# Patient Record
Sex: FEMALE | Race: WHITE | NOT HISPANIC OR LATINO | URBAN - NONMETROPOLITAN AREA
[De-identification: names, ages, dates, MRNs, and addresses within clinical notes are randomized per-mention and may not be internally consistent; named-entity substitution may affect disease eponyms.]

---

## 2018-07-31 PROBLEM — H35.3131: Noted: 2018-07-31

## 2018-07-31 PROBLEM — Z96.1: Noted: 2019-07-22

## 2018-07-31 PROBLEM — H40.013: Noted: 2019-07-22

## 2018-07-31 PROBLEM — H35.373: Noted: 2018-07-31

## 2018-07-31 PROBLEM — H04.123: Noted: 2018-07-31

## 2018-07-31 PROBLEM — H04.123: Noted: 2019-07-22

## 2018-07-31 PROBLEM — H02.055: Noted: 2018-07-31

## 2018-07-31 PROBLEM — Z96.1: Noted: 2018-07-31

## 2018-07-31 PROBLEM — H40.003: Noted: 2018-07-31

## 2019-01-26 ENCOUNTER — IMPORTED ENCOUNTER (OUTPATIENT)
Dept: URBAN - NONMETROPOLITAN AREA CLINIC 1 | Facility: CLINIC | Age: 84
End: 2019-01-26

## 2019-01-26 PROCEDURE — 99214 OFFICE O/P EST MOD 30 MIN: CPT

## 2019-01-26 NOTE — PATIENT DISCUSSION
AMD - dry-Explained dry AMD and advised that there are no treatments available at this time.-Continue AREDS 2 MVT. -Continue Amsler grid monitoring daily. Pt is to contact our office if any changes are noted. -RTC 6 months CEE/MRx*Epiretinal membrane: OU-Stable.-Discussed findings of exam in detail with the patient.-Discussed the signs of worsening of the disease. .Glaucoma Suspect-Based on CD assymetry-Appears stable at this time.-Continue to monitor with exams and testing.; 's Notes: Dmitriy Conrad - 1/23/18MAC OCT - 7/31/18VF- 4/30/13DFE- 7/31/18

## 2019-07-22 ENCOUNTER — IMPORTED ENCOUNTER (OUTPATIENT)
Dept: URBAN - NONMETROPOLITAN AREA CLINIC 1 | Facility: CLINIC | Age: 84
End: 2019-07-22

## 2019-07-22 PROCEDURE — 92012 INTRM OPH EXAM EST PATIENT: CPT

## 2019-07-22 NOTE — PATIENT DISCUSSION
AMD - dry-Explained dry AMD and advised that there are no treatments available at this time.-Continue AREDS 2 MVT. -Continue Amsler grid monitoring daily. Pt is to contact our office if any changes are noted. *Epiretinal membrane: OU-Stable.-Discussed findings of exam in detail with the patient.-Discussed the signs of worsening of the disease. .Glaucoma Suspect-Based on CD assymetry-Appears stable at this time.-Continue to monitor with exams and testing.-Obtain vf before next appt. OSITO-Explained OSITO and associated symptoms.-Recommend increasing Omega 3s.-Pt to begin artificial tears OU QID PRN.  Pt will contact us if this does not provide relief.; 's Notes: Alice Ohm - 1/23/18MAC OCT - 7/31/18VF- 4/30/13DFE- 7/31/18

## 2020-01-13 ENCOUNTER — IMPORTED ENCOUNTER (OUTPATIENT)
Dept: URBAN - NONMETROPOLITAN AREA CLINIC 1 | Facility: CLINIC | Age: 85
End: 2020-01-13

## 2020-01-13 PROCEDURE — 92083 EXTENDED VISUAL FIELD XM: CPT

## 2020-01-13 NOTE — PATIENT DISCUSSION
AMD - dry-Explained dry AMD and advised that there are no treatments available at this time.-Continue AREDS 2 MVT. -Continue Amsler grid monitoring daily. Pt is to contact our office if any changes are noted. *Epiretinal membrane: OU-Stable.-Discussed findings of exam in detail with the patient.-Discussed the signs of worsening of the disease. .Glaucoma Suspect-Based on CD assymetry-Appears stable at this time.-Continue to monitor with exams and testing.-Obtain vf before next appt. OSITO-Explained OSITO and associated symptoms.-Recommend increasing Omega 3s.-Pt to begin artificial tears OU QID PRN.  Pt will contact us if this does not provide relief.; Dr's Notes: Shu Aquino - 1/23/18MAC OCT - 7/31/18VF- 4/30/13DFE- 7/31/18

## 2020-01-28 ENCOUNTER — IMPORTED ENCOUNTER (OUTPATIENT)
Dept: URBAN - NONMETROPOLITAN AREA CLINIC 1 | Facility: CLINIC | Age: 85
End: 2020-01-28

## 2020-01-28 PROCEDURE — 92014 COMPRE OPH EXAM EST PT 1/>: CPT

## 2020-01-28 NOTE — PATIENT DISCUSSION
AMD - dry-Explained dry AMD and advised that there are no treatments available at this time.-Continue AREDS 2 MVT. -Continue Amsler grid monitoring daily. Pt is to contact our office if any changes are noted. *Epiretinal membrane: OU-Stable.-Discussed findings of exam in detail with the patient.-Discussed the signs of worsening of the disease. .Glaucoma Suspect-Based on CD assymetry-Appears stable at this time.-Continue to monitor with exams and testing.-Obtain vf before next appt. OSITO-Explained OSITO and associated symptoms.-Recommend increasing Omega 3s.-Pt to begin artificial tears OU QID PRN.  Pt will contact us if this does not provide relief.; 's Notes: Roxanne Dunbar - 1/23/18MAC OCT - 7/31/18VF- 4/30/13DFE- 7/31/18

## 2020-07-29 ENCOUNTER — IMPORTED ENCOUNTER (OUTPATIENT)
Dept: URBAN - NONMETROPOLITAN AREA CLINIC 1 | Facility: CLINIC | Age: 85
End: 2020-07-29

## 2020-07-29 PROCEDURE — 92014 COMPRE OPH EXAM EST PT 1/>: CPT

## 2020-07-29 PROCEDURE — 92134 CPTRZ OPH DX IMG PST SGM RTA: CPT

## 2020-07-29 NOTE — PATIENT DISCUSSION
AMD - dry-Explained dry AMD and advised that there are no treatments available at this time.-Continue AREDS 2 MVT. -Continue Amsler grid monitoring daily. Pt is to contact our office if any changes are noted. oct today/ stable ou*Epiretinal membrane: OU-Stable.-Discussed findings of exam in detail with the patient.-Discussed the signs of worsening of the disease. Ayden Julio oct today/stable ouGlaucoma Suspect-Based on CD assymetry-Appears stable at this time.-Continue to monitor with exams and testing. OSITO-Explained OSITO and associated symptoms.-Recommend increasing Omega 3s.-Pt to begin artificial tears OU QID PRN.  Pt will contact us if this does not provide relief.; 's Notes: Lu Sarabia - 1/23/18MAC OCT - 7/31/18VF- 4/30/13DFE- 7/31/18

## 2021-04-08 ENCOUNTER — IMPORTED ENCOUNTER (OUTPATIENT)
Dept: URBAN - NONMETROPOLITAN AREA CLINIC 1 | Facility: CLINIC | Age: 86
End: 2021-04-08

## 2021-04-08 PROCEDURE — 92014 COMPRE OPH EXAM EST PT 1/>: CPT

## 2021-04-08 NOTE — PATIENT DISCUSSION
AMD - dry-Explained dry AMD and advised that there are no treatments available at this time.-Continue AREDS 2 MVT. -Continue Amsler grid monitoring daily. Pt is to contact our office if any changes are noted. *Epiretinal membrane: OU-Stable.-Discussed findings of exam in detail with the patient.-Discussed the signs of worsening of the disease. .Glaucoma Suspect-Based on CD assymetry-Appears stable at this time.-Continue to monitor with exams and testing. OSITO-Explained OSITO and associated symptoms.-Recommend increasing Omega 3s.-Pt to begin artificial tears OU QID PRN.  Pt will contact us if this does not provide relief.; 's Notes: Hilda Herrera - 1/23/18MAC OCT - 7/31/18VF- 4/30/13DFE- 7/31/18

## 2021-10-07 ENCOUNTER — IMPORTED ENCOUNTER (OUTPATIENT)
Dept: URBAN - NONMETROPOLITAN AREA CLINIC 1 | Facility: CLINIC | Age: 86
End: 2021-10-07

## 2021-10-07 ENCOUNTER — PREPPED CHART (OUTPATIENT)
Dept: RURAL CLINIC 1 | Facility: CLINIC | Age: 86
End: 2021-10-07

## 2021-10-07 PROCEDURE — 92014 COMPRE OPH EXAM EST PT 1/>: CPT

## 2021-10-07 PROCEDURE — 92133 CPTRZD OPH DX IMG PST SGM ON: CPT

## 2021-10-07 NOTE — PATIENT DISCUSSION
AMD - dry-Explained dry AMD and advised that there are no treatments available at this time.-Continue AREDS 2 MVT. -Continue Amsler grid monitoring daily. Pt is to contact our office if any changes are noted. oct today/stable ou*Epiretinal membrane: OU-Stable.-Discussed findings of exam in detail with the patient.-Discussed the signs of worsening of the disease. .Glaucoma Suspect-Based on CD assymetry-Appears stable at this time.-Continue to monitor with exams and testing. OSITO-Explained OSITO and associated symptoms.-Recommend increasing Omega 3s.-Pt to begin artificial tears OU QID PRN.  Pt will contact us if this does not provide relief.; 's Notes: Jeanette Poe - 1/23/18MAC OCT - 7/31/18VF- 4/30/13DFE- 7/31/18

## 2022-03-20 PROBLEM — C50.919 BREAST CANCER IN FEMALE (HCC): Status: ACTIVE | Noted: 2021-03-05

## 2022-04-07 ENCOUNTER — ESTABLISHED PATIENT (OUTPATIENT)
Dept: RURAL CLINIC 1 | Facility: CLINIC | Age: 87
End: 2022-04-07

## 2022-04-07 DIAGNOSIS — H40.013: ICD-10-CM

## 2022-04-07 DIAGNOSIS — H35.373: ICD-10-CM

## 2022-04-07 DIAGNOSIS — H35.3131: ICD-10-CM

## 2022-04-07 DIAGNOSIS — Z96.1: ICD-10-CM

## 2022-04-07 PROCEDURE — 92014 COMPRE OPH EXAM EST PT 1/>: CPT

## 2022-04-07 PROCEDURE — 92133 CPTRZD OPH DX IMG PST SGM ON: CPT

## 2022-04-07 ASSESSMENT — VISUAL ACUITY
OS_SC: 20/200
OS_PH: 20/40
OD_PH: 20/25
OD_SC: 20/50

## 2022-04-07 ASSESSMENT — TONOMETRY
OS_IOP_MMHG: 14
OD_IOP_MMHG: 14

## 2022-04-09 ASSESSMENT — VISUAL ACUITY
OD_SC: 20/25-
OS_SC: 20/20-2
OD_SC: 20/20
OU_SC: 20/25
OD_CC: 20/30
OS_CC: J1+
OD_CC: J1
OD_SC: 20/30
OS_SC: 20/30
OS_CC: 20/30
OD_SC: 20/30
OD_CC: J1+
OS_CC: J1
OS_SC: 20/30
OU_SC: 20/25
OD_CC: J1
OS_CC: J1
OD_SC: 20/25
OU_SC: 20/25
OS_SC: 20/30+
OS_SC: 20/25+

## 2022-04-09 ASSESSMENT — TONOMETRY
OS_IOP_MMHG: 14
OS_IOP_MMHG: 15
OD_IOP_MMHG: 12
OS_IOP_MMHG: 14
OS_IOP_MMHG: 10
OD_IOP_MMHG: 12
OD_IOP_MMHG: 13
OS_IOP_MMHG: 13
OD_IOP_MMHG: 12
OD_IOP_MMHG: 15

## 2022-08-16 ENCOUNTER — ESTABLISHED PATIENT (OUTPATIENT)
Dept: RURAL CLINIC 1 | Facility: CLINIC | Age: 87
End: 2022-08-16

## 2022-08-16 DIAGNOSIS — Z96.1: ICD-10-CM

## 2022-08-16 DIAGNOSIS — H35.373: ICD-10-CM

## 2022-08-16 DIAGNOSIS — H04.123: ICD-10-CM

## 2022-08-16 DIAGNOSIS — H40.013: ICD-10-CM

## 2022-08-16 DIAGNOSIS — H35.3131: ICD-10-CM

## 2022-08-16 PROCEDURE — 92014 COMPRE OPH EXAM EST PT 1/>: CPT

## 2022-08-16 PROCEDURE — 92134 CPTRZ OPH DX IMG PST SGM RTA: CPT

## 2022-08-16 ASSESSMENT — TONOMETRY
OS_IOP_MMHG: 17
OD_IOP_MMHG: 17

## 2022-08-16 ASSESSMENT — VISUAL ACUITY
OD_CC: 20/25
OS_CC: 20/25-2
OU_CC: 20/20

## 2023-05-21 RX ORDER — DIGOXIN 125 MCG
0.12 TABLET ORAL EVERY OTHER DAY
COMMUNITY

## 2023-05-21 RX ORDER — MAGNESIUM OXIDE 400 MG/1
400 TABLET ORAL DAILY
COMMUNITY

## 2023-05-21 RX ORDER — ZOLPIDEM TARTRATE 5 MG/1
5 TABLET ORAL NIGHTLY PRN
COMMUNITY

## 2023-05-21 RX ORDER — ACETAMINOPHEN 500 MG
1000 TABLET ORAL EVERY 6 HOURS PRN
COMMUNITY

## 2023-05-21 RX ORDER — AMLODIPINE BESYLATE 2.5 MG/1
2.5 TABLET ORAL DAILY
COMMUNITY

## 2023-05-21 RX ORDER — ASPIRIN 81 MG/1
81 TABLET, CHEWABLE ORAL DAILY
COMMUNITY

## 2023-08-15 ENCOUNTER — FOLLOW UP (OUTPATIENT)
Dept: RURAL CLINIC 1 | Facility: CLINIC | Age: 88
End: 2023-08-15

## 2023-08-15 DIAGNOSIS — Z96.1: ICD-10-CM

## 2023-08-15 DIAGNOSIS — H35.3131: ICD-10-CM

## 2023-08-15 DIAGNOSIS — H40.013: ICD-10-CM

## 2023-08-15 DIAGNOSIS — H04.123: ICD-10-CM

## 2023-08-15 DIAGNOSIS — H35.373: ICD-10-CM

## 2023-08-15 PROCEDURE — 92014 COMPRE OPH EXAM EST PT 1/>: CPT

## 2023-08-15 PROCEDURE — 92134 CPTRZ OPH DX IMG PST SGM RTA: CPT

## 2023-08-15 ASSESSMENT — VISUAL ACUITY
OU_CC: 20/20
OD_CC: 20/25
OD_CC: 20/30
OU_CC: 20/30
OS_CC: 20/30
OS_CC: 20/25

## 2023-08-15 ASSESSMENT — TONOMETRY
OD_IOP_MMHG: 16
OS_IOP_MMHG: 16

## 2024-02-08 ENCOUNTER — COMPREHENSIVE EXAM (OUTPATIENT)
Dept: RURAL CLINIC 1 | Facility: CLINIC | Age: 89
End: 2024-02-08

## 2024-02-08 DIAGNOSIS — H35.373: ICD-10-CM

## 2024-02-08 DIAGNOSIS — H04.123: ICD-10-CM

## 2024-02-08 DIAGNOSIS — H35.3131: ICD-10-CM

## 2024-02-08 DIAGNOSIS — Z96.1: ICD-10-CM

## 2024-02-08 DIAGNOSIS — H40.013: ICD-10-CM

## 2024-02-08 PROCEDURE — 92014 COMPRE OPH EXAM EST PT 1/>: CPT

## 2024-02-08 PROCEDURE — 92083 EXTENDED VISUAL FIELD XM: CPT

## 2024-02-08 ASSESSMENT — VISUAL ACUITY
OS_PH: 20/25-2
OS_SC: 20/40-1
OD_SC: 20/40
OD_PH: 20/30+2

## 2024-02-08 ASSESSMENT — TONOMETRY
OS_IOP_MMHG: 15
OD_IOP_MMHG: 15

## 2024-07-17 ENCOUNTER — FOLLOW UP (OUTPATIENT)
Dept: RURAL CLINIC 1 | Facility: CLINIC | Age: 89
End: 2024-07-17

## 2024-07-17 DIAGNOSIS — H04.123: ICD-10-CM

## 2024-07-17 DIAGNOSIS — H35.373: ICD-10-CM

## 2024-07-17 DIAGNOSIS — H40.013: ICD-10-CM

## 2024-07-17 DIAGNOSIS — H35.3131: ICD-10-CM

## 2024-07-17 PROCEDURE — 92134 CPTRZ OPH DX IMG PST SGM RTA: CPT

## 2024-07-17 PROCEDURE — 92014 COMPRE OPH EXAM EST PT 1/>: CPT

## 2024-07-17 ASSESSMENT — VISUAL ACUITY
OD_SC: 20/40-2
OS_SC: 20/25-2
OD_SC: 20/25-2
OU_SC: 20/25-2
OS_SC: 20/400
OU_SC: 20/40-1

## 2024-07-17 ASSESSMENT — TONOMETRY
OS_IOP_MMHG: 14
OD_IOP_MMHG: 14

## 2025-01-07 ENCOUNTER — FOLLOW UP (OUTPATIENT)
Age: OVER 89
End: 2025-01-07

## 2025-01-07 DIAGNOSIS — H35.373: ICD-10-CM

## 2025-01-07 DIAGNOSIS — H04.123: ICD-10-CM

## 2025-01-07 DIAGNOSIS — H35.3131: ICD-10-CM

## 2025-01-07 DIAGNOSIS — H40.013: ICD-10-CM

## 2025-01-07 PROCEDURE — 92083 EXTENDED VISUAL FIELD XM: CPT

## 2025-01-07 PROCEDURE — 99214 OFFICE O/P EST MOD 30 MIN: CPT

## 2025-06-24 ENCOUNTER — FOLLOW UP (OUTPATIENT)
Age: OVER 89
End: 2025-06-24

## 2025-06-24 DIAGNOSIS — H04.123: ICD-10-CM

## 2025-06-24 DIAGNOSIS — H40.013: ICD-10-CM

## 2025-06-24 DIAGNOSIS — H35.3131: ICD-10-CM

## 2025-06-24 DIAGNOSIS — H35.373: ICD-10-CM

## 2025-06-24 PROCEDURE — 92014 COMPRE OPH EXAM EST PT 1/>: CPT

## 2025-06-24 PROCEDURE — 92134 CPTRZ OPH DX IMG PST SGM RTA: CPT
